# Patient Record
Sex: FEMALE
[De-identification: names, ages, dates, MRNs, and addresses within clinical notes are randomized per-mention and may not be internally consistent; named-entity substitution may affect disease eponyms.]

---

## 2019-03-13 ENCOUNTER — HOSPITAL ENCOUNTER (OUTPATIENT)
Dept: HOSPITAL 88 - MAMMO | Age: 49
End: 2019-03-13
Attending: OBSTETRICS & GYNECOLOGY
Payer: COMMERCIAL

## 2019-03-13 DIAGNOSIS — Z12.31: Primary | ICD-10-CM

## 2019-03-13 PROCEDURE — 77067 SCR MAMMO BI INCL CAD: CPT

## 2019-04-11 ENCOUNTER — HOSPITAL ENCOUNTER (OUTPATIENT)
Dept: HOSPITAL 88 - MAMMO | Age: 49
End: 2019-04-11
Attending: OBSTETRICS & GYNECOLOGY
Payer: COMMERCIAL

## 2019-04-11 DIAGNOSIS — R92.8: Primary | ICD-10-CM

## 2019-04-12 NOTE — DIAGNOSTIC IMAGING REPORT
#[id number] - [id number]

#UNILATERAL RIGHT DIGITAL DIAGNOSTIC MAMMOGRAM WITH SPOT COMPRESSION: 4/11/2019

Comparison is made to exams dated:  3/13/2019 mammogram and 10/3/2016 mammogram - [facility].  Current study contains 3 films.  

The tissue of the right breast is heterogeneously dense. This may lower the sensitivity of 

mammography.  

There is a 1.7 cm mass in the right breast at 1 o'clock middle depth with an associated 

calcification.  

No other significant masses or calcifications are seen in the breast.  



IMPRESSION: INCOMPLETE: NEEDS ADDITIONAL IMAGING EVALUATION

The 1.7 cm mass in the right breast is indeterminate.  An ultrasound is recommended and will be 

performed today.  

Follow-up with ACR/ACS guidelines. 



Brent Nunes Jr., D.O.          

cw/:4/11/2019 13:37:17  



Imaging Technologist: Kalee MENON(MAXIMINO)(ANUPAM), [facility]



Mammogram BI-RADS: 0 Indeterminate

## 2019-04-12 NOTE — DIAGNOSTIC IMAGING REPORT
#[id number] - [id number]

ULTRASOUND OF THE RIGHT BREAST : 4/11/2019

Comparison is made to exam dated:  4/11/2019 mammogram - [facility].  

Color flow and real-time ultrasound were performed on the right breast with scanning from 12-3 

o'clock. 



-There is a small cyst at 12 o'clock 1 cm from the nipple measuring 0.3 x 0.3 x 0.3 cm



-There is a hypoechoic nodule with a single foci of calcification at 1 o'clock 2 cm from the nipple 

measuring 1.0 x 0.6 x 1.0 cm.  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 

benign fibroadenoma.  



IMPRESSION: PROBABLY BENIGN - FOLLOW-UP RECOMMENDED

A follow-up mammogram and an ultrasound in 6 months is recommended to demonstrate stability.



The patient was notified of the need for followup unilateral mammography and ultrasound.  



Brent Nunes Jr., D.O.  

cw/:4/11/2019 13:43:33  



Imaging Technologist: BEAN HINSON RDMS, [facility]

letter sent: Followup Recommended  

Ultrasound BI-RADS: 3 Probably benign

## 2021-03-12 ENCOUNTER — HOSPITAL ENCOUNTER (OUTPATIENT)
Dept: HOSPITAL 88 - MAMMO | Age: 51
End: 2021-03-12
Attending: OBSTETRICS & GYNECOLOGY
Payer: COMMERCIAL

## 2021-03-12 DIAGNOSIS — Z12.31: Primary | ICD-10-CM

## 2021-03-12 PROCEDURE — 77067 SCR MAMMO BI INCL CAD: CPT

## 2022-04-01 ENCOUNTER — HOSPITAL ENCOUNTER (OUTPATIENT)
Dept: HOSPITAL 88 - MAMMO | Age: 52
End: 2022-04-01
Attending: OBSTETRICS & GYNECOLOGY
Payer: COMMERCIAL

## 2022-04-01 DIAGNOSIS — Z12.31: Primary | ICD-10-CM

## 2022-04-01 PROCEDURE — 77067 SCR MAMMO BI INCL CAD: CPT

## 2023-04-28 ENCOUNTER — HOSPITAL ENCOUNTER (OUTPATIENT)
Dept: HOSPITAL 88 - DX | Age: 53
End: 2023-04-28
Attending: OBSTETRICS & GYNECOLOGY
Payer: COMMERCIAL

## 2023-04-28 DIAGNOSIS — M85.88: ICD-10-CM

## 2023-04-28 DIAGNOSIS — Z12.31: Primary | ICD-10-CM

## 2023-04-28 PROCEDURE — 77080 DXA BONE DENSITY AXIAL: CPT

## 2023-04-28 PROCEDURE — 77067 SCR MAMMO BI INCL CAD: CPT
